# Patient Record
Sex: FEMALE | Race: WHITE | Employment: FULL TIME | URBAN - METROPOLITAN AREA
[De-identification: names, ages, dates, MRNs, and addresses within clinical notes are randomized per-mention and may not be internally consistent; named-entity substitution may affect disease eponyms.]

---

## 2021-09-19 ENCOUNTER — APPOINTMENT (OUTPATIENT)
Dept: GENERAL RADIOLOGY | Age: 56
End: 2021-09-19
Attending: EMERGENCY MEDICINE
Payer: COMMERCIAL

## 2021-09-19 ENCOUNTER — HOSPITAL ENCOUNTER (EMERGENCY)
Age: 56
Discharge: HOME | End: 2021-09-19
Attending: EMERGENCY MEDICINE
Payer: COMMERCIAL

## 2021-09-19 VITALS
DIASTOLIC BLOOD PRESSURE: 53 MMHG | BODY MASS INDEX: 28.85 KG/M2 | WEIGHT: 169 LBS | HEART RATE: 59 BPM | RESPIRATION RATE: 15 BRPM | SYSTOLIC BLOOD PRESSURE: 96 MMHG | TEMPERATURE: 98.6 F | OXYGEN SATURATION: 99 % | HEIGHT: 64 IN

## 2021-09-19 DIAGNOSIS — M79.652 MUSCULOSKELETAL PAIN OF LEFT THIGH: Primary | ICD-10-CM

## 2021-09-19 PROCEDURE — 73502 X-RAY EXAM HIP UNI 2-3 VIEWS: CPT

## 2021-09-19 PROCEDURE — 99284 EMERGENCY DEPT VISIT MOD MDM: CPT

## 2021-09-19 PROCEDURE — 96372 THER/PROPH/DIAG INJ SC/IM: CPT

## 2021-09-19 PROCEDURE — 74011250636 HC RX REV CODE- 250/636: Performed by: EMERGENCY MEDICINE

## 2021-09-19 RX ORDER — METHYLPREDNISOLONE 4 MG/1
TABLET ORAL
Qty: 1 DOSE PACK | Refills: 0 | Status: SHIPPED | OUTPATIENT
Start: 2021-09-19

## 2021-09-19 RX ORDER — CHOLECALCIFEROL (VITAMIN D3) 125 MCG
CAPSULE ORAL
COMMUNITY

## 2021-09-19 RX ORDER — HYDROCODONE BITARTRATE AND ACETAMINOPHEN 7.5; 3 MG/1; MG/1
2 TABLET ORAL
COMMUNITY

## 2021-09-19 RX ORDER — KETOROLAC TROMETHAMINE 30 MG/ML
30 INJECTION, SOLUTION INTRAMUSCULAR; INTRAVENOUS ONCE
Status: COMPLETED | OUTPATIENT
Start: 2021-09-19 | End: 2021-09-19

## 2021-09-19 RX ORDER — ESCITALOPRAM OXALATE 20 MG/1
20 TABLET ORAL DAILY
COMMUNITY

## 2021-09-19 RX ORDER — HYDROCODONE BITARTRATE AND ACETAMINOPHEN 5; 325 MG/1; MG/1
1 TABLET ORAL
Qty: 12 TABLET | Refills: 0 | Status: SHIPPED | OUTPATIENT
Start: 2021-09-19 | End: 2021-09-22

## 2021-09-19 RX ORDER — VERAPAMIL HYDROCHLORIDE 240 MG/1
240 TABLET, FILM COATED, EXTENDED RELEASE ORAL
COMMUNITY

## 2021-09-19 RX ADMIN — KETOROLAC TROMETHAMINE 30 MG: 30 INJECTION, SOLUTION INTRAMUSCULAR; INTRAVENOUS at 07:34

## 2021-09-19 NOTE — ED TRIAGE NOTES
Patient reports to ED c/c left hip pain onset 9/9/21. Patient seen at [facility] and received an Xray, dx with arthritis. Patient reports pain has worsened, and reports falling due to pain a couple times this week.

## 2021-09-19 NOTE — ED PROVIDER NOTES
EMERGENCY DEPARTMENT HISTORY AND PHYSICAL EXAM      Date: 9/19/2021  Patient Name: Davi Tate    History of Presenting Illness     Chief Complaint   Patient presents with    Hip Pain       History Provided By: Patient    Chief Complaint: Left hip pain      Additional History (Context): Davi Tate is a 61 y.o. female with Prior lumbar fusion surgery who presents with persistent left hip pain resulting in fall in bathtub today. Have been experiencing the symptoms prior to the fall. [facility] at [facility] where an x-ray showed arthritic changes but no fracture. Has any numbness, tingling or weakness to the left leg, worsening of her chronic low back pain, new urinary bladder incontinence. Patient can  bear weight on her left leg. therapies trialed at home include Vicodin, ibuprofen, Tylenol some relief. PCP: [facility]    Current Outpatient Medications   Medication Sig Dispense Refill    HYDROcodone-acetaminophen (XODOL) 7.5-300 mg tablet Take 2 Tablets by mouth.  escitalopram oxalate (Lexapro) 20 mg tablet Take 20 mg by mouth daily.  cholecalciferol, vitamin D3, (Vitamin D3) 50 mcg (2,000 unit) tab Take  by mouth.  verapamil ER (CALAN-SR) 240 mg CR tablet Take 240 mg by mouth nightly.  HYDROcodone-acetaminophen (Norco) 5-325 mg per tablet Take 1 Tablet by mouth every six (6) hours as needed for Pain for up to 3 days. Max Daily Amount: 4 Tablets. 12 Tablet 0    methylPREDNISolone (Medrol, Sae,) 4 mg tablet Per dose pack instructions 1 Dose Pack 0       Past History     Past Medical History:  Past Medical History:   Diagnosis Date    Arthritis     Back pain, chronic     Migraine     Psychiatric disorder     anxiety       Past Surgical History:  Past Surgical History:   Procedure Laterality Date    HX LUMBAR FUSION         Family History:  No family history on file.     Social History:  Social History     Tobacco Use    Smoking status: Never Smoker  Smokeless tobacco: Never Used   Substance Use Topics    Alcohol use: Yes    Drug use: Not Currently       Allergies: Allergies   Allergen Reactions    Celebrex [Celecoxib] Nausea and Vomiting    Darvocet A500 [Propoxyphene N-Acetaminophen] Nausea and Vomiting    Ultram [Tramadol] Nausea and Vomiting    Voltaren [Diclofenac Sodium] Nausea and Vomiting         Review of Systems   Review of Systems   Constitutional: Negative for chills and fever. HENT: Negative for congestion. Respiratory: Negative for cough and shortness of breath. Cardiovascular: Negative for chest pain. Gastrointestinal: Negative for nausea and vomiting. Genitourinary: Negative for dysuria and frequency. Musculoskeletal: Positive for arthralgias and back pain. Negative for gait problem. Skin: Negative for rash and wound. Neurological: Negative for dizziness, syncope, weakness and numbness. Physical Exam     Vitals:    09/19/21 0653 09/19/21 0730 09/19/21 0800 09/19/21 0845   BP: 117/66 (!) 109/57 (!) 102/56 (!) 96/53   Pulse: 83 66 63 (!) 59   Resp: 16 16 17 15   Temp: 98.6 °F (37 °C)      SpO2: 96% 91% 92% 99%   Weight: 76.7 kg (169 lb)      Height: 5' 4\" (1.626 m)        Physical Exam  Vitals and nursing note reviewed. Constitutional:       General: She is not in acute distress. Appearance: Normal appearance. She is not ill-appearing. HENT:      Head: Normocephalic and atraumatic. Mouth/Throat:      Mouth: Mucous membranes are moist.   Eyes:      Extraocular Movements: Extraocular movements intact. Pupils: Pupils are equal, round, and reactive to light. Cardiovascular:      Rate and Rhythm: Normal rate and regular rhythm. Pulses: Normal pulses. Heart sounds: Normal heart sounds. Pulmonary:      Effort: Pulmonary effort is normal.      Breath sounds: Normal breath sounds. Abdominal:      General: Abdomen is flat. There is no distension. Tenderness:  There is no abdominal tenderness. There is no guarding. Musculoskeletal:         General: Tenderness (left greater trochanter) present. No swelling or deformity. Normal range of motion. Cervical back: Normal range of motion. Left lower leg: No edema. Skin:     General: Skin is warm and dry. Capillary Refill: Capillary refill takes less than 2 seconds. Neurological:      Mental Status: She is alert and oriented to person, place, and time. Psychiatric:         Mood and Affect: Mood normal.         Behavior: Behavior normal.           Diagnostic Study Results     Labs -   No results found for this or any previous visit (from the past 12 hour(s)). Radiologic Studies -   XR HIP LT W OR WO PELV 2-3 VWS   Final Result      No significant abnormality. CT Results  (Last 48 hours)    None        CXR Results  (Last 48 hours)    None            Medical Decision Making   I am the first provider for this patient. I reviewed the vital signs, available nursing notes, past medical history, past surgical history, family history and social history. Vital Signs-Reviewed the patient's vital signs. Records Reviewed: Nursing Notes    ED Course:    Left hip x-ray, intramuscular Toradol injection    Left hip x-ray showed no acute fracture or other bony abnormalities    Disposition:  Discharge home    DISCHARGE NOTE:     Pt has been reexamined. Patient has no new complaints, changes, or physical findings. Care plan outlined and precautions discussed. Results of x-ray were reviewed with the patient. All medications were reviewed with the patient; will d/c home with self. All of pt's questions and concerns were addressed. Patient was instructed and agrees to follow up with primary care provider, pain specialist, as well as to return to the ED upon further deterioration. Patient is ready to go home.     Follow-up Information     Follow up With Specialties Details Why Contact [facility] Schedule an appointment as soon as possible for a visit in 1 day For follow-up on your symptoms, consideration of physical therapy referral versus adjustment of chronic pain regiment Rhett Sanchez  [phone]            Current Discharge Medication List      START taking these medications    Details   HYDROcodone-acetaminophen (Norco) 5-325 mg per tablet Take 1 Tablet by mouth every six (6) hours as needed for Pain for up to 3 days. Max Daily Amount: 4 Tablets. Qty: 12 Tablet, Refills: 0  Start date: 9/19/2021, End date: 9/22/2021    Associated Diagnoses: Musculoskeletal pain of left thigh      methylPREDNISolone (Medrol, Sae,) 4 mg tablet Per dose pack instructions  Qty: 1 Dose Pack, Refills: 0  Start date: 9/19/2021         CONTINUE these medications which have NOT CHANGED    Details   HYDROcodone-acetaminophen (XODOL) 7.5-300 mg tablet Take 2 Tablets by mouth. Provider Notes (Medical Decision Making): Patient's condition most consistent with Acute Left Thigh pain, possible Iliotibial Band syndrome based on history, exam and objective testings. Other diagnoses considered but deemed less likely include femoral neck or shaft fracture, open pelvis fracture. ED course as above. Treatment plan includes steroid taper, short course of Norco as patient is almost out of her Vicodin and I believe this is a separate pain from her chronic low back pain. Strict return precautions given in case of inability to ambulate, severe intractable pain, fever. Procedures:  Procedures        Diagnosis     Clinical Impression:   1. Musculoskeletal pain of left thigh        Cathy Mcdonald D.O.   Emergency Physician  [facility]

## 2021-09-19 NOTE — LETTER
[facility] [place]  [facility]  [address] [id number] [phone]    Work/School Note    Date: 9/19/2021    To Whom It May concern:    Mitra Cabezas was seen and treated today in the emergency room by the following provider(s):  Attending Provider: Taina Young DO. Mitra Cabezas is excused from work/school on 9/19/2021 through 9/22/2021. She is medically clear to return to work/school on 9/23/2021.         Sincerely,        provider(s):  Attending Provider: Taina Young DO